# Patient Record
Sex: FEMALE | Race: WHITE | NOT HISPANIC OR LATINO | Employment: UNEMPLOYED | URBAN - NONMETROPOLITAN AREA
[De-identification: names, ages, dates, MRNs, and addresses within clinical notes are randomized per-mention and may not be internally consistent; named-entity substitution may affect disease eponyms.]

---

## 2017-09-15 ENCOUNTER — APPOINTMENT (OUTPATIENT)
Dept: CT IMAGING | Facility: HOSPITAL | Age: 67
End: 2017-09-15

## 2017-09-15 ENCOUNTER — APPOINTMENT (OUTPATIENT)
Dept: GENERAL RADIOLOGY | Facility: HOSPITAL | Age: 67
End: 2017-09-15

## 2017-09-15 ENCOUNTER — HOSPITAL ENCOUNTER (INPATIENT)
Facility: HOSPITAL | Age: 67
LOS: 4 days | Discharge: HOME | End: 2017-09-19
Attending: SPECIALIST | Admitting: SPECIALIST

## 2017-09-15 DIAGNOSIS — Z74.09 IMPAIRED FUNCTIONAL MOBILITY, BALANCE, AND ENDURANCE: ICD-10-CM

## 2017-09-15 DIAGNOSIS — S22.49XA MULTIPLE FRACTURES OF RIB INVOLVING FOUR OR MORE RIBS: Primary | ICD-10-CM

## 2017-09-15 LAB
ALBUMIN SERPL-MCNC: 3.7 G/DL (ref 3.5–5)
ALBUMIN/GLOB SERPL: 1.2 G/DL (ref 1.1–2.5)
ALP SERPL-CCNC: 102 U/L (ref 24–120)
ALT SERPL W P-5'-P-CCNC: 35 U/L (ref 0–54)
ANION GAP SERPL CALCULATED.3IONS-SCNC: 7 MMOL/L (ref 4–13)
AST SERPL-CCNC: 41 U/L (ref 7–45)
BACTERIA UR QL AUTO: ABNORMAL /HPF
BILIRUB SERPL-MCNC: 1.2 MG/DL (ref 0.1–1)
BILIRUB UR QL STRIP: NEGATIVE
BUN BLD-MCNC: 24 MG/DL (ref 5–21)
BUN/CREAT SERPL: 32.4 (ref 7–25)
CALCIUM SPEC-SCNC: 9.7 MG/DL (ref 8.4–10.4)
CHLORIDE SERPL-SCNC: 106 MMOL/L (ref 98–110)
CK MB SERPL-CCNC: 3.11 NG/ML (ref 0–5)
CK SERPL-CCNC: 132 U/L (ref 0–203)
CK SERPL-CCNC: 132 U/L (ref 0–203)
CLARITY UR: ABNORMAL
CO2 SERPL-SCNC: 27 MMOL/L (ref 24–31)
COLOR UR: YELLOW
CREAT BLD-MCNC: 0.74 MG/DL (ref 0.5–1.4)
GFR SERPL CREATININE-BSD FRML MDRD: 78 ML/MIN/1.73
GLOBULIN UR ELPH-MCNC: 3.1 GM/DL
GLUCOSE BLD-MCNC: 258 MG/DL (ref 70–100)
GLUCOSE BLDC GLUCOMTR-MCNC: 254 MG/DL (ref 70–130)
GLUCOSE UR STRIP-MCNC: ABNORMAL MG/DL
HBA1C MFR BLD: 9.1 %
HGB UR QL STRIP.AUTO: NEGATIVE
HYALINE CASTS UR QL AUTO: ABNORMAL /LPF
KETONES UR QL STRIP: ABNORMAL
LEUKOCYTE ESTERASE UR QL STRIP.AUTO: ABNORMAL
NITRITE UR QL STRIP: POSITIVE
PH UR STRIP.AUTO: <=5 [PH] (ref 5–8)
POTASSIUM BLD-SCNC: 5.1 MMOL/L (ref 3.5–5.3)
PROT SERPL-MCNC: 6.8 G/DL (ref 6.3–8.7)
PROT UR QL STRIP: NEGATIVE
RBC # UR: ABNORMAL /HPF
REF LAB TEST METHOD: ABNORMAL
SODIUM BLD-SCNC: 140 MMOL/L (ref 135–145)
SP GR UR STRIP: >1.03 (ref 1–1.03)
SQUAMOUS #/AREA URNS HPF: ABNORMAL /HPF
TROPONIN I SERPL-MCNC: <0.012 NG/ML (ref 0–0.03)
UROBILINOGEN UR QL STRIP: ABNORMAL
WBC UR QL AUTO: ABNORMAL /HPF

## 2017-09-15 PROCEDURE — 25010000002 HEPARIN (PORCINE) PER 1000 UNITS: Performed by: NEUROLOGICAL SURGERY

## 2017-09-15 PROCEDURE — 84484 ASSAY OF TROPONIN QUANT: CPT | Performed by: SPECIALIST

## 2017-09-15 PROCEDURE — 83036 HEMOGLOBIN GLYCOSYLATED A1C: CPT | Performed by: SPECIALIST

## 2017-09-15 PROCEDURE — 82962 GLUCOSE BLOOD TEST: CPT

## 2017-09-15 PROCEDURE — 71010 HC CHEST PA OR AP: CPT

## 2017-09-15 PROCEDURE — 87186 SC STD MICRODIL/AGAR DIL: CPT | Performed by: SPECIALIST

## 2017-09-15 PROCEDURE — 94799 UNLISTED PULMONARY SVC/PX: CPT

## 2017-09-15 PROCEDURE — 80053 COMPREHEN METABOLIC PANEL: CPT | Performed by: NEUROLOGICAL SURGERY

## 2017-09-15 PROCEDURE — 87077 CULTURE AEROBIC IDENTIFY: CPT | Performed by: SPECIALIST

## 2017-09-15 PROCEDURE — 63710000001 INSULIN LISPRO (HUMAN) PER 5 UNITS: Performed by: SPECIALIST

## 2017-09-15 PROCEDURE — 70450 CT HEAD/BRAIN W/O DYE: CPT

## 2017-09-15 PROCEDURE — 70496 CT ANGIOGRAPHY HEAD: CPT

## 2017-09-15 PROCEDURE — 25010000002 HYDROMORPHONE HCL-NACL 50-0.9 MG/50ML-% SOLUTION PREFILLED SYRINGE: Performed by: SPECIALIST

## 2017-09-15 PROCEDURE — 70498 CT ANGIOGRAPHY NECK: CPT

## 2017-09-15 PROCEDURE — 93010 ELECTROCARDIOGRAM REPORT: CPT | Performed by: INTERNAL MEDICINE

## 2017-09-15 PROCEDURE — 87086 URINE CULTURE/COLONY COUNT: CPT | Performed by: SPECIALIST

## 2017-09-15 PROCEDURE — 82550 ASSAY OF CK (CPK): CPT | Performed by: SPECIALIST

## 2017-09-15 PROCEDURE — 81001 URINALYSIS AUTO W/SCOPE: CPT | Performed by: SPECIALIST

## 2017-09-15 PROCEDURE — 0 IOPAMIDOL PER 1 ML: Performed by: SPECIALIST

## 2017-09-15 PROCEDURE — 99223 1ST HOSP IP/OBS HIGH 75: CPT | Performed by: NEUROLOGICAL SURGERY

## 2017-09-15 PROCEDURE — 93005 ELECTROCARDIOGRAM TRACING: CPT | Performed by: SPECIALIST

## 2017-09-15 PROCEDURE — 82553 CREATINE MB FRACTION: CPT | Performed by: SPECIALIST

## 2017-09-15 RX ORDER — ALENDRONATE SODIUM 70 MG/1
70 TABLET ORAL
COMMUNITY

## 2017-09-15 RX ORDER — SUCRALFATE ORAL 1 G/10ML
1 SUSPENSION ORAL EVERY 6 HOURS SCHEDULED
Status: DISCONTINUED | OUTPATIENT
Start: 2017-09-15 | End: 2017-09-19 | Stop reason: HOSPADM

## 2017-09-15 RX ORDER — HEPARIN SODIUM 5000 [USP'U]/ML
5000 INJECTION, SOLUTION INTRAVENOUS; SUBCUTANEOUS EVERY 12 HOURS SCHEDULED
Status: DISCONTINUED | OUTPATIENT
Start: 2017-09-15 | End: 2017-09-19 | Stop reason: HOSPADM

## 2017-09-15 RX ORDER — NALOXONE HCL 0.4 MG/ML
0.1 VIAL (ML) INJECTION
Status: DISCONTINUED | OUTPATIENT
Start: 2017-09-15 | End: 2017-09-19 | Stop reason: HOSPADM

## 2017-09-15 RX ORDER — GABAPENTIN 300 MG/1
300 CAPSULE ORAL 2 TIMES DAILY
COMMUNITY

## 2017-09-15 RX ORDER — HYDROMORPHONE HCL IN 0.9% NACL 50 MG/50ML
PATIENT CONTROLLED ANALGESIA SYRINGE INTRAVENOUS CONTINUOUS
Status: DISCONTINUED | OUTPATIENT
Start: 2017-09-15 | End: 2017-09-18

## 2017-09-15 RX ORDER — DEXTROSE MONOHYDRATE 25 G/50ML
25 INJECTION, SOLUTION INTRAVENOUS
Status: DISCONTINUED | OUTPATIENT
Start: 2017-09-15 | End: 2017-09-16

## 2017-09-15 RX ORDER — DEXTROSE, SODIUM CHLORIDE, AND POTASSIUM CHLORIDE 5; .45; .15 G/100ML; G/100ML; G/100ML
125 INJECTION INTRAVENOUS CONTINUOUS
Status: DISCONTINUED | OUTPATIENT
Start: 2017-09-15 | End: 2017-09-15

## 2017-09-15 RX ORDER — GLIPIZIDE 10 MG/1
10 TABLET ORAL
COMMUNITY

## 2017-09-15 RX ORDER — HYDROCODONE BITARTRATE AND ACETAMINOPHEN 7.5; 325 MG/1; MG/1
1 TABLET ORAL EVERY 4 HOURS PRN
Status: DISCONTINUED | OUTPATIENT
Start: 2017-09-15 | End: 2017-09-19 | Stop reason: HOSPADM

## 2017-09-15 RX ORDER — DIPHENHYDRAMINE HYDROCHLORIDE 50 MG/ML
25 INJECTION INTRAMUSCULAR; INTRAVENOUS EVERY 6 HOURS PRN
Status: DISCONTINUED | OUTPATIENT
Start: 2017-09-15 | End: 2017-09-19 | Stop reason: HOSPADM

## 2017-09-15 RX ORDER — DEXTROSE AND SODIUM CHLORIDE 5; .45 G/100ML; G/100ML
125 INJECTION, SOLUTION INTRAVENOUS CONTINUOUS
Status: DISCONTINUED | OUTPATIENT
Start: 2017-09-15 | End: 2017-09-16

## 2017-09-15 RX ORDER — NICOTINE POLACRILEX 4 MG
15 LOZENGE BUCCAL
Status: DISCONTINUED | OUTPATIENT
Start: 2017-09-15 | End: 2017-09-19 | Stop reason: HOSPADM

## 2017-09-15 RX ORDER — ONDANSETRON 8 MG/1
8 TABLET, ORALLY DISINTEGRATING ORAL EVERY 6 HOURS PRN
Status: DISCONTINUED | OUTPATIENT
Start: 2017-09-15 | End: 2017-09-19 | Stop reason: HOSPADM

## 2017-09-15 RX ORDER — SODIUM CHLORIDE 0.9 % (FLUSH) 0.9 %
1-10 SYRINGE (ML) INJECTION AS NEEDED
Status: DISCONTINUED | OUTPATIENT
Start: 2017-09-15 | End: 2017-09-19 | Stop reason: HOSPADM

## 2017-09-15 RX ORDER — PANTOPRAZOLE SODIUM 40 MG/1
40 TABLET, DELAYED RELEASE ORAL
Status: DISCONTINUED | OUTPATIENT
Start: 2017-09-15 | End: 2017-09-19 | Stop reason: HOSPADM

## 2017-09-15 RX ORDER — OXYCODONE HYDROCHLORIDE AND ACETAMINOPHEN 5; 325 MG/1; MG/1
1 TABLET ORAL EVERY 4 HOURS PRN
Status: DISCONTINUED | OUTPATIENT
Start: 2017-09-15 | End: 2017-09-17 | Stop reason: SDUPTHER

## 2017-09-15 RX ORDER — PAROXETINE 10 MG/1
10 TABLET, FILM COATED ORAL NIGHTLY PRN
COMMUNITY

## 2017-09-15 RX ORDER — ONDANSETRON HCL IN 0.9 % NACL 8 MG/50 ML
8 INTRAVENOUS SOLUTION, PIGGYBACK (ML) INTRAVENOUS EVERY 6 HOURS PRN
Status: DISCONTINUED | OUTPATIENT
Start: 2017-09-15 | End: 2017-09-19 | Stop reason: HOSPADM

## 2017-09-15 RX ADMIN — Medication: at 14:15

## 2017-09-15 RX ADMIN — PANTOPRAZOLE SODIUM 40 MG: 40 TABLET, DELAYED RELEASE ORAL at 18:05

## 2017-09-15 RX ADMIN — POTASSIUM CHLORIDE, DEXTROSE MONOHYDRATE AND SODIUM CHLORIDE 125 ML/HR: 150; 5; 450 INJECTION, SOLUTION INTRAVENOUS at 14:11

## 2017-09-15 RX ADMIN — SUCRALFATE 1 G: 1 SUSPENSION ORAL at 14:51

## 2017-09-15 RX ADMIN — HEPARIN SODIUM 5000 UNITS: 5000 INJECTION, SOLUTION INTRAVENOUS; SUBCUTANEOUS at 20:53

## 2017-09-15 RX ADMIN — DEXTROSE AND SODIUM CHLORIDE 125 ML/HR: 5; 450 INJECTION, SOLUTION INTRAVENOUS at 15:28

## 2017-09-15 RX ADMIN — IOPAMIDOL 100 ML: 755 INJECTION, SOLUTION INTRAVENOUS at 18:45

## 2017-09-15 RX ADMIN — INSULIN LISPRO 4 UNITS: 100 INJECTION, SOLUTION INTRAVENOUS; SUBCUTANEOUS at 20:53

## 2017-09-15 RX ADMIN — SUCRALFATE 1 G: 1 SUSPENSION ORAL at 18:05

## 2017-09-15 RX ADMIN — SODIUM CHLORIDE, POTASSIUM CHLORIDE, SODIUM LACTATE AND CALCIUM CHLORIDE 500 ML: 600; 310; 30; 20 INJECTION, SOLUTION INTRAVENOUS at 15:26
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
## 2017-09-15 NOTE — PROGRESS NOTES
Following discussion with neurosurgery and orthopedics will plan a CTA of the head and neck this afternoon to evaluate her cranium no surgery is planned on her right wrist, we can start Lovenox when okay with orthopedics and neurosurgery neurosurgery desires heparin and I am fine with heparin and he can dose as needed for neurosurgical concerns.

## 2017-09-15 NOTE — PLAN OF CARE
Problem: Patient Care Overview (Adult)  Goal: Plan of Care Review  Outcome: Ongoing (interventions implemented as appropriate)    09/15/17 0923   Coping/Psychosocial Response Interventions   Plan Of Care Reviewed With patient   Patient Care Overview   Progress no change       Goal: Adult Individualization and Mutuality  Outcome: Ongoing (interventions implemented as appropriate)  Goal: Discharge Needs Assessment  Outcome: Ongoing (interventions implemented as appropriate)    Problem: Orthopaedic Fracture (Adult)  Goal: Signs and Symptoms of Listed Potential Problems Will be Absent or Manageable (Orthopaedic Fracture)  Outcome: Ongoing (interventions implemented as appropriate)    Problem: Pain, Acute (Adult)  Goal: Identify Related Risk Factors and Signs and Symptoms  Outcome: Ongoing (interventions implemented as appropriate)  Goal: Acceptable Pain Control/Comfort Level  Outcome: Ongoing (interventions implemented as appropriate)
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
## 2017-09-15 NOTE — PROGRESS NOTES
Patient is doing well this afternoon the CT scan has been accomplished the neck and cervical neck and upper chest are within normal limits that head CT shows a suspected trace subarachnoid hemorrhage along the anterior falx and frontoparietal scalp subgaleal hematoma no other problems noted she is eating I have advised her to increase her activity we will begin ambulation in the a.m.
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
## 2017-09-16 ENCOUNTER — APPOINTMENT (OUTPATIENT)
Dept: GENERAL RADIOLOGY | Facility: HOSPITAL | Age: 67
End: 2017-09-16

## 2017-09-16 LAB
ALBUMIN SERPL-MCNC: 3.1 G/DL (ref 3.5–5)
ALBUMIN/GLOB SERPL: 1.1 G/DL (ref 1.1–2.5)
ALP SERPL-CCNC: 75 U/L (ref 24–120)
ALT SERPL W P-5'-P-CCNC: 30 U/L (ref 0–54)
ANION GAP SERPL CALCULATED.3IONS-SCNC: 7 MMOL/L (ref 4–13)
AST SERPL-CCNC: 26 U/L (ref 7–45)
BILIRUB SERPL-MCNC: 1.2 MG/DL (ref 0.1–1)
BUN BLD-MCNC: 18 MG/DL (ref 5–21)
BUN/CREAT SERPL: 28.1 (ref 7–25)
CALCIUM SPEC-SCNC: 8.5 MG/DL (ref 8.4–10.4)
CHLORIDE SERPL-SCNC: 104 MMOL/L (ref 98–110)
CO2 SERPL-SCNC: 26 MMOL/L (ref 24–31)
CREAT BLD-MCNC: 0.64 MG/DL (ref 0.5–1.4)
DEPRECATED RDW RBC AUTO: 46.5 FL (ref 40–54)
ERYTHROCYTE [DISTWIDTH] IN BLOOD BY AUTOMATED COUNT: 14.3 % (ref 12–15)
GFR SERPL CREATININE-BSD FRML MDRD: 93 ML/MIN/1.73
GLOBULIN UR ELPH-MCNC: 2.9 GM/DL
GLUCOSE BLD-MCNC: 273 MG/DL (ref 70–100)
GLUCOSE BLDC GLUCOMTR-MCNC: 233 MG/DL (ref 70–130)
GLUCOSE BLDC GLUCOMTR-MCNC: 270 MG/DL (ref 70–130)
HCT VFR BLD AUTO: 35.7 % (ref 37–47)
HGB BLD-MCNC: 11.3 G/DL (ref 12–16)
MCH RBC QN AUTO: 28.3 PG (ref 28–32)
MCHC RBC AUTO-ENTMCNC: 31.7 G/DL (ref 33–36)
MCV RBC AUTO: 89.3 FL (ref 82–98)
PLATELET # BLD AUTO: 204 10*3/MM3 (ref 130–400)
PMV BLD AUTO: 11.4 FL (ref 6–12)
POTASSIUM BLD-SCNC: 4.7 MMOL/L (ref 3.5–5.3)
PROT SERPL-MCNC: 6 G/DL (ref 6.3–8.7)
RBC # BLD AUTO: 4 10*6/MM3 (ref 4.2–5.4)
SODIUM BLD-SCNC: 137 MMOL/L (ref 135–145)
WBC NRBC COR # BLD: 7.91 10*3/MM3 (ref 4.8–10.8)

## 2017-09-16 PROCEDURE — 85027 COMPLETE CBC AUTOMATED: CPT | Performed by: SPECIALIST

## 2017-09-16 PROCEDURE — 71020 HC CHEST PA AND LATERAL: CPT

## 2017-09-16 PROCEDURE — 94799 UNLISTED PULMONARY SVC/PX: CPT

## 2017-09-16 PROCEDURE — 25010000002 HEPARIN (PORCINE) PER 1000 UNITS: Performed by: NEUROLOGICAL SURGERY

## 2017-09-16 PROCEDURE — 63710000001 INSULIN LISPRO (HUMAN) PER 5 UNITS: Performed by: SPECIALIST

## 2017-09-16 PROCEDURE — 99231 SBSQ HOSP IP/OBS SF/LOW 25: CPT | Performed by: NEUROLOGICAL SURGERY

## 2017-09-16 PROCEDURE — 74020 HC XR ABDOMEN FLAT & UPRIGHT: CPT

## 2017-09-16 PROCEDURE — 80053 COMPREHEN METABOLIC PANEL: CPT | Performed by: SPECIALIST

## 2017-09-16 PROCEDURE — 82962 GLUCOSE BLOOD TEST: CPT

## 2017-09-16 RX ORDER — DEXTROSE AND SODIUM CHLORIDE 5; .45 G/100ML; G/100ML
50 INJECTION, SOLUTION INTRAVENOUS CONTINUOUS
Status: DISCONTINUED | OUTPATIENT
Start: 2017-09-16 | End: 2017-09-19 | Stop reason: HOSPADM

## 2017-09-16 RX ORDER — PAROXETINE 10 MG/1
10 TABLET, FILM COATED ORAL DAILY
Status: DISCONTINUED | OUTPATIENT
Start: 2017-09-16 | End: 2017-09-19 | Stop reason: HOSPADM

## 2017-09-16 RX ORDER — GABAPENTIN 300 MG/1
300 CAPSULE ORAL EVERY 12 HOURS SCHEDULED
Status: DISCONTINUED | OUTPATIENT
Start: 2017-09-16 | End: 2017-09-19 | Stop reason: HOSPADM

## 2017-09-16 RX ORDER — GLIPIZIDE 10 MG/1
10 TABLET ORAL
Status: DISCONTINUED | OUTPATIENT
Start: 2017-09-16 | End: 2017-09-19 | Stop reason: HOSPADM

## 2017-09-16 RX ADMIN — INSULIN LISPRO 3 UNITS: 100 INJECTION, SOLUTION INTRAVENOUS; SUBCUTANEOUS at 21:37

## 2017-09-16 RX ADMIN — SUCRALFATE 1 G: 1 SUSPENSION ORAL at 00:02

## 2017-09-16 RX ADMIN — DEXTROSE AND SODIUM CHLORIDE 50 ML/HR: 5; 450 INJECTION, SOLUTION INTRAVENOUS at 12:21

## 2017-09-16 RX ADMIN — METFORMIN HYDROCHLORIDE 1000 MG: 500 TABLET ORAL at 17:03

## 2017-09-16 RX ADMIN — DEXTROSE AND SODIUM CHLORIDE 125 ML/HR: 5; 450 INJECTION, SOLUTION INTRAVENOUS at 08:09

## 2017-09-16 RX ADMIN — GLIPIZIDE 10 MG: 10 TABLET ORAL at 17:03

## 2017-09-16 RX ADMIN — GABAPENTIN 300 MG: 300 CAPSULE ORAL at 21:37

## 2017-09-16 RX ADMIN — INSULIN LISPRO 4 UNITS: 100 INJECTION, SOLUTION INTRAVENOUS; SUBCUTANEOUS at 09:07

## 2017-09-16 RX ADMIN — HEPARIN SODIUM 5000 UNITS: 5000 INJECTION, SOLUTION INTRAVENOUS; SUBCUTANEOUS at 22:34

## 2017-09-16 RX ADMIN — PANTOPRAZOLE SODIUM 40 MG: 40 TABLET, DELAYED RELEASE ORAL at 17:03

## 2017-09-16 RX ADMIN — DEXTROSE AND SODIUM CHLORIDE 125 ML/HR: 5; 450 INJECTION, SOLUTION INTRAVENOUS at 00:04

## 2017-09-16 RX ADMIN — PANTOPRAZOLE SODIUM 40 MG: 40 TABLET, DELAYED RELEASE ORAL at 06:35

## 2017-09-16 RX ADMIN — HEPARIN SODIUM 5000 UNITS: 5000 INJECTION, SOLUTION INTRAVENOUS; SUBCUTANEOUS at 09:07

## 2017-09-16 RX ADMIN — GABAPENTIN 300 MG: 300 CAPSULE ORAL at 12:21

## 2017-09-16 RX ADMIN — METFORMIN HYDROCHLORIDE 1000 MG: 500 TABLET ORAL at 13:30

## 2017-09-16 RX ADMIN — SUCRALFATE 1 G: 1 SUSPENSION ORAL at 06:26
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
## 2017-09-16 NOTE — PLAN OF CARE
Problem: Patient Care Overview (Adult)  Goal: Plan of Care Review  Outcome: Ongoing (interventions implemented as appropriate)    09/16/17 0433   Coping/Psychosocial Response Interventions   Plan Of Care Reviewed With patient   Patient Care Overview   Progress improving   Outcome Evaluation   Outcome Summary/Follow up Plan The patient has been able to control her pain adequately with her PCA. She has slept through the night and had no issues using the bed pan. She expresses the desire to move more today and states she feels much better.        Goal: Adult Individualization and Mutuality  Outcome: Ongoing (interventions implemented as appropriate)  Goal: Discharge Needs Assessment  Outcome: Ongoing (interventions implemented as appropriate)    Problem: Orthopaedic Fracture (Adult)  Goal: Signs and Symptoms of Listed Potential Problems Will be Absent or Manageable (Orthopaedic Fracture)  Outcome: Ongoing (interventions implemented as appropriate)    Problem: Pain, Acute (Adult)  Goal: Identify Related Risk Factors and Signs and Symptoms  Outcome: Ongoing (interventions implemented as appropriate)  Goal: Acceptable Pain Control/Comfort Level  Outcome: Ongoing (interventions implemented as appropriate)    Problem: Fall Risk (Adult)  Goal: Identify Related Risk Factors and Signs and Symptoms  Outcome: Ongoing (interventions implemented as appropriate)  Goal: Absence of Falls  Outcome: Ongoing (interventions implemented as appropriate)

## 2017-09-17 LAB
BACTERIA SPEC AEROBE CULT: ABNORMAL
GLUCOSE BLDC GLUCOMTR-MCNC: 174 MG/DL (ref 70–130)
GLUCOSE BLDC GLUCOMTR-MCNC: 234 MG/DL (ref 70–130)
GLUCOSE BLDC GLUCOMTR-MCNC: 268 MG/DL (ref 70–130)
GLUCOSE BLDC GLUCOMTR-MCNC: 270 MG/DL (ref 70–130)

## 2017-09-17 PROCEDURE — 63710000001 INSULIN LISPRO (HUMAN) PER 5 UNITS: Performed by: SPECIALIST

## 2017-09-17 PROCEDURE — 25010000002 HEPARIN (PORCINE) PER 1000 UNITS: Performed by: NEUROLOGICAL SURGERY

## 2017-09-17 PROCEDURE — 82962 GLUCOSE BLOOD TEST: CPT

## 2017-09-17 PROCEDURE — 94799 UNLISTED PULMONARY SVC/PX: CPT

## 2017-09-17 PROCEDURE — 25010000002 ONDANSETRON PER 1 MG: Performed by: SPECIALIST

## 2017-09-17 RX ORDER — OXYCODONE HYDROCHLORIDE AND ACETAMINOPHEN 5; 325 MG/1; MG/1
1 TABLET ORAL EVERY 4 HOURS PRN
Status: DISCONTINUED | OUTPATIENT
Start: 2017-09-17 | End: 2017-09-19 | Stop reason: HOSPADM

## 2017-09-17 RX ADMIN — HYDROCODONE BITARTRATE AND ACETAMINOPHEN 1 TABLET: 7.5; 325 TABLET ORAL at 14:56

## 2017-09-17 RX ADMIN — METFORMIN HYDROCHLORIDE 1000 MG: 500 TABLET ORAL at 08:17

## 2017-09-17 RX ADMIN — GABAPENTIN 300 MG: 300 CAPSULE ORAL at 08:17

## 2017-09-17 RX ADMIN — PANTOPRAZOLE SODIUM 40 MG: 40 TABLET, DELAYED RELEASE ORAL at 17:00

## 2017-09-17 RX ADMIN — GLIPIZIDE 10 MG: 10 TABLET ORAL at 08:17

## 2017-09-17 RX ADMIN — SUCRALFATE 1 G: 1 SUSPENSION ORAL at 06:21

## 2017-09-17 RX ADMIN — GLIPIZIDE 10 MG: 10 TABLET ORAL at 17:01

## 2017-09-17 RX ADMIN — PAROXETINE 10 MG: 10 TABLET, FILM COATED ORAL at 08:17

## 2017-09-17 RX ADMIN — DEXTROSE AND SODIUM CHLORIDE 50 ML/HR: 5; 450 INJECTION, SOLUTION INTRAVENOUS at 18:34

## 2017-09-17 RX ADMIN — INSULIN LISPRO 3 UNITS: 100 INJECTION, SOLUTION INTRAVENOUS; SUBCUTANEOUS at 17:15

## 2017-09-17 RX ADMIN — SUCRALFATE 1 G: 1 SUSPENSION ORAL at 12:36

## 2017-09-17 RX ADMIN — PANTOPRAZOLE SODIUM 40 MG: 40 TABLET, DELAYED RELEASE ORAL at 08:17

## 2017-09-17 RX ADMIN — SUCRALFATE 1 G: 1 SUSPENSION ORAL at 17:00

## 2017-09-17 RX ADMIN — GABAPENTIN 300 MG: 300 CAPSULE ORAL at 22:44

## 2017-09-17 RX ADMIN — ONDANSETRON 8 MG: 2 INJECTION INTRAMUSCULAR; INTRAVENOUS at 08:17

## 2017-09-17 RX ADMIN — METFORMIN HYDROCHLORIDE 1000 MG: 500 TABLET ORAL at 17:01

## 2017-09-17 RX ADMIN — DEXTROSE AND SODIUM CHLORIDE 50 ML/HR: 5; 450 INJECTION, SOLUTION INTRAVENOUS at 00:35

## 2017-09-17 RX ADMIN — HEPARIN SODIUM 5000 UNITS: 5000 INJECTION, SOLUTION INTRAVENOUS; SUBCUTANEOUS at 08:17

## 2017-09-17 RX ADMIN — SUCRALFATE 1 G: 1 SUSPENSION ORAL at 00:35

## 2017-09-17 RX ADMIN — INSULIN LISPRO 2 UNITS: 100 INJECTION, SOLUTION INTRAVENOUS; SUBCUTANEOUS at 22:44

## 2017-09-17 RX ADMIN — SUCRALFATE 1 G: 1 SUSPENSION ORAL at 23:04

## 2017-09-17 RX ADMIN — HEPARIN SODIUM 5000 UNITS: 5000 INJECTION, SOLUTION INTRAVENOUS; SUBCUTANEOUS at 22:44
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
## 2017-09-17 NOTE — PLAN OF CARE
Problem: Patient Care Overview (Adult)  Goal: Plan of Care Review  Outcome: Ongoing (interventions implemented as appropriate)    09/17/17 0223   Coping/Psychosocial Response Interventions   Plan Of Care Reviewed With patient   Patient Care Overview   Progress improving       Goal: Adult Individualization and Mutuality  Outcome: Ongoing (interventions implemented as appropriate)  Goal: Discharge Needs Assessment  Outcome: Ongoing (interventions implemented as appropriate)    Problem: Orthopaedic Fracture (Adult)  Goal: Signs and Symptoms of Listed Potential Problems Will be Absent or Manageable (Orthopaedic Fracture)  Outcome: Ongoing (interventions implemented as appropriate)    Problem: Pain, Acute (Adult)  Goal: Identify Related Risk Factors and Signs and Symptoms  Outcome: Ongoing (interventions implemented as appropriate)  Goal: Acceptable Pain Control/Comfort Level  Outcome: Ongoing (interventions implemented as appropriate)    Problem: Fall Risk (Adult)  Goal: Identify Related Risk Factors and Signs and Symptoms  Outcome: Ongoing (interventions implemented as appropriate)  Goal: Absence of Falls  Outcome: Ongoing (interventions implemented as appropriate)

## 2017-09-17 NOTE — PLAN OF CARE
Problem: Patient Care Overview (Adult)  Goal: Plan of Care Review  Outcome: Ongoing (interventions implemented as appropriate)    09/17/17 1610   Coping/Psychosocial Response Interventions   Plan Of Care Reviewed With patient   Patient Care Overview   Progress no change   Outcome Evaluation   Outcome Summary/Follow up Plan Patient c/o pain during coughing, but rates only minimal pain when resting. Pca cont. Also provided norco x1. Reports decreased pain w/ medication. Patient is ambulating well, but does appear to be incredibly nauseated occassionally during ambulation. Patient did have 1 emesis this AM. Tolerating diet, but reports a decreased appetite. Patient is hopeful for d/c tomorrow, as she believes she can rest/heal better from home. Will cont to monitor.        Goal: Adult Individualization and Mutuality  Outcome: Ongoing (interventions implemented as appropriate)  Goal: Discharge Needs Assessment  Outcome: Ongoing (interventions implemented as appropriate)    Problem: Orthopaedic Fracture (Adult)  Goal: Signs and Symptoms of Listed Potential Problems Will be Absent or Manageable (Orthopaedic Fracture)  Outcome: Ongoing (interventions implemented as appropriate)    09/17/17 1610   Orthopaedic Fracture   Problems Assessed (Orthopaedic Fracture) all   Problems Present (Orthopaedic Fracture) pain;situational response         Problem: Pain, Acute (Adult)  Goal: Identify Related Risk Factors and Signs and Symptoms  Outcome: Ongoing (interventions implemented as appropriate)    09/17/17 1610   Pain, Acute   Related Risk Factors (Acute Pain) persistent pain   Signs and Symptoms (Acute Pain) verbalization of pain descriptors       Goal: Acceptable Pain Control/Comfort Level  Outcome: Ongoing (interventions implemented as appropriate)    Problem: Fall Risk (Adult)  Goal: Identify Related Risk Factors and Signs and Symptoms  Outcome: Ongoing (interventions implemented as appropriate)    09/17/17 1610   Fall Risk    Fall Risk: Related Risk Factors environment unfamiliar   Fall Risk: Signs and Symptoms presence of risk factors       Goal: Absence of Falls  Outcome: Ongoing (interventions implemented as appropriate)

## 2017-09-18 LAB
GLUCOSE BLDC GLUCOMTR-MCNC: 204 MG/DL (ref 70–130)
GLUCOSE BLDC GLUCOMTR-MCNC: 213 MG/DL (ref 70–130)

## 2017-09-18 PROCEDURE — 25010000002 HEPARIN (PORCINE) PER 1000 UNITS: Performed by: NEUROLOGICAL SURGERY

## 2017-09-18 PROCEDURE — 82962 GLUCOSE BLOOD TEST: CPT

## 2017-09-18 PROCEDURE — 63710000001 INSULIN LISPRO (HUMAN) PER 5 UNITS: Performed by: SPECIALIST

## 2017-09-18 RX ADMIN — GLIPIZIDE 10 MG: 10 TABLET ORAL at 08:08

## 2017-09-18 RX ADMIN — HYDROCODONE BITARTRATE AND ACETAMINOPHEN 1 TABLET: 7.5; 325 TABLET ORAL at 19:56

## 2017-09-18 RX ADMIN — PANTOPRAZOLE SODIUM 40 MG: 40 TABLET, DELAYED RELEASE ORAL at 06:33

## 2017-09-18 RX ADMIN — SUCRALFATE 1 G: 1 SUSPENSION ORAL at 17:47

## 2017-09-18 RX ADMIN — GABAPENTIN 300 MG: 300 CAPSULE ORAL at 20:13

## 2017-09-18 RX ADMIN — GABAPENTIN 300 MG: 300 CAPSULE ORAL at 08:08

## 2017-09-18 RX ADMIN — METFORMIN HYDROCHLORIDE 1000 MG: 500 TABLET ORAL at 08:08

## 2017-09-18 RX ADMIN — SUCRALFATE 1 G: 1 SUSPENSION ORAL at 06:33

## 2017-09-18 RX ADMIN — PAROXETINE 10 MG: 10 TABLET, FILM COATED ORAL at 08:08

## 2017-09-18 RX ADMIN — INSULIN LISPRO 3 UNITS: 100 INJECTION, SOLUTION INTRAVENOUS; SUBCUTANEOUS at 20:22

## 2017-09-18 RX ADMIN — HEPARIN SODIUM 5000 UNITS: 5000 INJECTION, SOLUTION INTRAVENOUS; SUBCUTANEOUS at 08:08

## 2017-09-18 RX ADMIN — HEPARIN SODIUM 5000 UNITS: 5000 INJECTION, SOLUTION INTRAVENOUS; SUBCUTANEOUS at 20:13

## 2017-09-18 RX ADMIN — GLIPIZIDE 10 MG: 10 TABLET ORAL at 17:47

## 2017-09-18 RX ADMIN — METFORMIN HYDROCHLORIDE 1000 MG: 500 TABLET ORAL at 17:47

## 2017-09-18 RX ADMIN — INSULIN LISPRO 3 UNITS: 100 INJECTION, SOLUTION INTRAVENOUS; SUBCUTANEOUS at 08:12

## 2017-09-18 NOTE — PLAN OF CARE
Problem: Orthopaedic Fracture (Adult)  Goal: Signs and Symptoms of Listed Potential Problems Will be Absent or Manageable (Orthopaedic Fracture)  Outcome: Ongoing (interventions implemented as appropriate)

## 2017-09-18 NOTE — PLAN OF CARE
Problem: Patient Care Overview (Adult)  Goal: Plan of Care Review  Outcome: Ongoing (interventions implemented as appropriate)  Pain decreased today. Encouraged to use po pain meds  Instead of IV PCA plan is to discharge in am.
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
## 2017-09-18 NOTE — PLAN OF CARE
Problem: Patient Care Overview (Adult)  Goal: Plan of Care Review  Outcome: Ongoing (interventions implemented as appropriate)    09/18/17 0300   Coping/Psychosocial Response Interventions   Plan Of Care Reviewed With patient   Patient Care Overview   Progress no change   Outcome Evaluation   Outcome Summary/Follow up Plan Pt c/o pain only when asked. Dilaudid PCA remains in use. Up to BSC. Pt becomes extremely weak and nauseated with the small walk to BSC. Otherwise, no c/o nausea. Pt also educated on importance of insulin and has stopped refusing it. VSS. Will continue to monitor.        Goal: Adult Individualization and Mutuality  Outcome: Ongoing (interventions implemented as appropriate)    09/18/17 0300   Individualization   Patient Specific Preferences Prefers lights left on.   Patient Specific Goals Goal is to return home this week and to gain as much indepence back as possible.    Patient Specific Interventions Dilaudid PCA   Mutuality/Individual Preferences   What Anxieties, Fears or Concerns Do You Have About Your Health or Care? none identified   What Questions Do You Have About Your Health or Care? When do you think I will be discharged?   What Information Would Help Us Give You More Personalized Care? none identified         Problem: Orthopaedic Fracture (Adult)  Goal: Signs and Symptoms of Listed Potential Problems Will be Absent or Manageable (Orthopaedic Fracture)  Outcome: Ongoing (interventions implemented as appropriate)    09/18/17 0300   Orthopaedic Fracture   Problems Assessed (Orthopaedic Fracture) all   Problems Present (Orthopaedic Fracture) pain;situational response         Problem: Pain, Acute (Adult)  Goal: Identify Related Risk Factors and Signs and Symptoms  Outcome: Ongoing (interventions implemented as appropriate)    09/18/17 0300   Pain, Acute   Related Risk Factors (Acute Pain) persistent pain;positioning   Signs and Symptoms (Acute Pain) verbalization of pain descriptors        Goal: Acceptable Pain Control/Comfort Level  Outcome: Ongoing (interventions implemented as appropriate)    09/18/17 0300   Pain, Acute (Adult)   Acceptable Pain Control/Comfort Level making progress toward outcome         Problem: Fall Risk (Adult)  Goal: Identify Related Risk Factors and Signs and Symptoms  Outcome: Ongoing (interventions implemented as appropriate)    09/18/17 0300   Fall Risk   Fall Risk: Related Risk Factors environment unfamiliar   Fall Risk: Signs and Symptoms presence of risk factors       Goal: Absence of Falls  Outcome: Ongoing (interventions implemented as appropriate)    09/18/17 0300   Fall Risk (Adult)   Absence of Falls making progress toward outcome

## 2017-09-19 ENCOUNTER — APPOINTMENT (OUTPATIENT)
Dept: GENERAL RADIOLOGY | Facility: HOSPITAL | Age: 67
End: 2017-09-19

## 2017-09-19 VITALS
DIASTOLIC BLOOD PRESSURE: 46 MMHG | OXYGEN SATURATION: 93 % | RESPIRATION RATE: 16 BRPM | BODY MASS INDEX: 39.18 KG/M2 | HEART RATE: 76 BPM | WEIGHT: 289.3 LBS | SYSTOLIC BLOOD PRESSURE: 106 MMHG | HEIGHT: 72 IN | TEMPERATURE: 98.2 F

## 2017-09-19 LAB
GLUCOSE BLDC GLUCOMTR-MCNC: 155 MG/DL (ref 70–130)
GLUCOSE BLDC GLUCOMTR-MCNC: 177 MG/DL (ref 70–130)

## 2017-09-19 PROCEDURE — G8978 MOBILITY CURRENT STATUS: HCPCS | Performed by: PHYSICAL THERAPIST

## 2017-09-19 PROCEDURE — G8980 MOBILITY D/C STATUS: HCPCS | Performed by: PHYSICAL THERAPIST

## 2017-09-19 PROCEDURE — 82962 GLUCOSE BLOOD TEST: CPT

## 2017-09-19 PROCEDURE — 25010000002 HEPARIN (PORCINE) PER 1000 UNITS: Performed by: NEUROLOGICAL SURGERY

## 2017-09-19 PROCEDURE — 97162 PT EVAL MOD COMPLEX 30 MIN: CPT

## 2017-09-19 PROCEDURE — G8979 MOBILITY GOAL STATUS: HCPCS | Performed by: PHYSICAL THERAPIST

## 2017-09-19 PROCEDURE — 63710000001 INSULIN LISPRO (HUMAN) PER 5 UNITS: Performed by: SPECIALIST

## 2017-09-19 PROCEDURE — 71020 HC CHEST PA AND LATERAL: CPT

## 2017-09-19 RX ORDER — OXYCODONE HYDROCHLORIDE AND ACETAMINOPHEN 5; 325 MG/1; MG/1
1 TABLET ORAL EVERY 4 HOURS PRN
Qty: 40 TABLET | Refills: 0 | Status: SHIPPED | OUTPATIENT
Start: 2017-09-19

## 2017-09-19 RX ORDER — METHYLCELLULOSE 2 G/19G
2 POWDER, FOR SOLUTION ORAL DAILY
Qty: 60 G | Refills: 6 | Status: SHIPPED | OUTPATIENT
Start: 2017-09-19 | End: 2017-10-19

## 2017-09-19 RX ORDER — MAGNESIUM CARB/ALUMINUM HYDROX 105-160MG
296 TABLET,CHEWABLE ORAL ONCE
Status: COMPLETED | OUTPATIENT
Start: 2017-09-19 | End: 2017-09-19

## 2017-09-19 RX ORDER — ONDANSETRON HCL 8 MG
8 TABLET ORAL EVERY 8 HOURS PRN
Qty: 10 TABLET | Refills: 1 | Status: SHIPPED | OUTPATIENT
Start: 2017-09-19

## 2017-09-19 RX ADMIN — HYDROCODONE BITARTRATE AND ACETAMINOPHEN 1 TABLET: 7.5; 325 TABLET ORAL at 00:52

## 2017-09-19 RX ADMIN — PAROXETINE 10 MG: 10 TABLET, FILM COATED ORAL at 08:26

## 2017-09-19 RX ADMIN — GABAPENTIN 300 MG: 300 CAPSULE ORAL at 08:30

## 2017-09-19 RX ADMIN — Medication 296 ML: at 14:28

## 2017-09-19 RX ADMIN — HEPARIN SODIUM 5000 UNITS: 5000 INJECTION, SOLUTION INTRAVENOUS; SUBCUTANEOUS at 08:27

## 2017-09-19 RX ADMIN — PANTOPRAZOLE SODIUM 40 MG: 40 TABLET, DELAYED RELEASE ORAL at 08:27

## 2017-09-19 RX ADMIN — INSULIN LISPRO 2 UNITS: 100 INJECTION, SOLUTION INTRAVENOUS; SUBCUTANEOUS at 08:27

## 2017-09-19 RX ADMIN — SUCRALFATE 1 G: 1 SUSPENSION ORAL at 00:00

## 2017-09-19 RX ADMIN — SUCRALFATE 1 G: 1 SUSPENSION ORAL at 11:33

## 2017-09-19 RX ADMIN — GLIPIZIDE 10 MG: 10 TABLET ORAL at 08:27

## 2017-09-19 RX ADMIN — HYDROCODONE BITARTRATE AND ACETAMINOPHEN 1 TABLET: 7.5; 325 TABLET ORAL at 07:49

## 2017-09-19 RX ADMIN — INSULIN LISPRO 2 UNITS: 100 INJECTION, SOLUTION INTRAVENOUS; SUBCUTANEOUS at 11:32

## 2017-09-19 RX ADMIN — OXYCODONE HYDROCHLORIDE AND ACETAMINOPHEN 1 TABLET: 5; 325 TABLET ORAL at 11:32

## 2017-09-19 RX ADMIN — METFORMIN HYDROCHLORIDE 1000 MG: 500 TABLET ORAL at 08:26

## 2017-09-19 RX ADMIN — OXYCODONE HYDROCHLORIDE AND ACETAMINOPHEN 1 TABLET: 5; 325 TABLET ORAL at 16:00
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
## 2017-09-19 NOTE — PLAN OF CARE
Problem: Patient Care Overview (Adult)  Goal: Plan of Care Review    09/19/17 1451   Coping/Psychosocial Response Interventions   Plan Of Care Reviewed With patient   Patient Care Overview   Progress progress toward functional goals as expected   Outcome Evaluation   Outcome Summary/Follow up Plan Pt agreed to and completed physical therapy evaluation. Pt was able to perform all bed mobility and transfers independently. Pt required supervision with ambulation and was able to go 50 feet. Pt is limited primarily by pain secondary to rib fx. Pt is safe with mobility to return home. Anticipated discharge to home with assistance.
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
## 2017-09-19 NOTE — PLAN OF CARE
Problem: Patient Care Overview (Adult)  Goal: Plan of Care Review  Outcome: Ongoing (interventions implemented as appropriate)    09/19/17 0338   Coping/Psychosocial Response Interventions   Plan Of Care Reviewed With patient   Patient Care Overview   Progress no change   Outcome Evaluation   Outcome Summary/Follow up Plan PCA DC'd norco prn for pain; no complaints of nausea; up to the bedside commode       Goal: Adult Individualization and Mutuality  Outcome: Ongoing (interventions implemented as appropriate)  Goal: Discharge Needs Assessment  Outcome: Ongoing (interventions implemented as appropriate)    Problem: Orthopaedic Fracture (Adult)  Goal: Signs and Symptoms of Listed Potential Problems Will be Absent or Manageable (Orthopaedic Fracture)  Outcome: Ongoing (interventions implemented as appropriate)    Problem: Pain, Acute (Adult)  Goal: Identify Related Risk Factors and Signs and Symptoms  Outcome: Outcome(s) achieved Date Met:  09/19/17  Goal: Acceptable Pain Control/Comfort Level  Outcome: Ongoing (interventions implemented as appropriate)    Problem: Fall Risk (Adult)  Goal: Identify Related Risk Factors and Signs and Symptoms  Outcome: Outcome(s) achieved Date Met:  09/19/17  Goal: Absence of Falls  Outcome: Ongoing (interventions implemented as appropriate)

## 2019-09-30 ENCOUNTER — OUTSIDE FACILITY SERVICE (OUTPATIENT)
Dept: CARDIOLOGY | Facility: CLINIC | Age: 69
End: 2019-09-30

## 2019-09-30 PROCEDURE — 78452 HT MUSCLE IMAGE SPECT MULT: CPT | Performed by: INTERNAL MEDICINE

## 2019-09-30 PROCEDURE — 93018 CV STRESS TEST I&R ONLY: CPT | Performed by: INTERNAL MEDICINE

## 2019-10-24 ENCOUNTER — OFFICE VISIT (OUTPATIENT)
Dept: CARDIOLOGY | Age: 69
End: 2019-10-24
Payer: MEDICARE

## 2019-10-24 VITALS
WEIGHT: 261 LBS | HEART RATE: 91 BPM | BODY MASS INDEX: 37.37 KG/M2 | SYSTOLIC BLOOD PRESSURE: 138 MMHG | DIASTOLIC BLOOD PRESSURE: 88 MMHG | HEIGHT: 70 IN

## 2019-10-24 DIAGNOSIS — I10 ESSENTIAL HYPERTENSION: ICD-10-CM

## 2019-10-24 DIAGNOSIS — R94.39 ABNORMAL STRESS TEST: Primary | ICD-10-CM

## 2019-10-24 DIAGNOSIS — I20.8 ANGINA AT REST (HCC): ICD-10-CM

## 2019-10-24 PROBLEM — I20.89 ANGINA AT REST: Status: ACTIVE | Noted: 2019-10-24

## 2019-10-24 PROCEDURE — G8417 CALC BMI ABV UP PARAM F/U: HCPCS | Performed by: INTERNAL MEDICINE

## 2019-10-24 PROCEDURE — 1036F TOBACCO NON-USER: CPT | Performed by: INTERNAL MEDICINE

## 2019-10-24 PROCEDURE — 1123F ACP DISCUSS/DSCN MKR DOCD: CPT | Performed by: INTERNAL MEDICINE

## 2019-10-24 PROCEDURE — 1090F PRES/ABSN URINE INCON ASSESS: CPT | Performed by: INTERNAL MEDICINE

## 2019-10-24 PROCEDURE — 3017F COLORECTAL CA SCREEN DOC REV: CPT | Performed by: INTERNAL MEDICINE

## 2019-10-24 PROCEDURE — 4040F PNEUMOC VAC/ADMIN/RCVD: CPT | Performed by: INTERNAL MEDICINE

## 2019-10-24 PROCEDURE — G8400 PT W/DXA NO RESULTS DOC: HCPCS | Performed by: INTERNAL MEDICINE

## 2019-10-24 PROCEDURE — 93000 ELECTROCARDIOGRAM COMPLETE: CPT | Performed by: INTERNAL MEDICINE

## 2019-10-24 PROCEDURE — G8484 FLU IMMUNIZE NO ADMIN: HCPCS | Performed by: INTERNAL MEDICINE

## 2019-10-24 PROCEDURE — 99204 OFFICE O/P NEW MOD 45 MIN: CPT | Performed by: INTERNAL MEDICINE

## 2019-10-24 PROCEDURE — G8599 NO ASA/ANTIPLAT THER USE RNG: HCPCS | Performed by: INTERNAL MEDICINE

## 2019-10-24 PROCEDURE — G8427 DOCREV CUR MEDS BY ELIG CLIN: HCPCS | Performed by: INTERNAL MEDICINE

## 2019-10-24 RX ORDER — PRAVASTATIN SODIUM 40 MG
40 TABLET ORAL DAILY
COMMUNITY

## 2019-10-24 RX ORDER — ALENDRONATE SODIUM 70 MG/1
70 TABLET ORAL
COMMUNITY

## 2019-10-24 RX ORDER — GABAPENTIN 300 MG/1
300 CAPSULE ORAL 3 TIMES DAILY
COMMUNITY

## 2019-10-24 RX ORDER — GLIPIZIDE 10 MG/1
10 TABLET ORAL
COMMUNITY

## 2019-10-24 RX ORDER — LOSARTAN POTASSIUM 100 MG/1
100 TABLET ORAL DAILY
COMMUNITY

## 2019-10-24 RX ORDER — PAROXETINE HYDROCHLORIDE 20 MG/1
20 TABLET, FILM COATED ORAL NIGHTLY
COMMUNITY

## 2019-10-24 SDOH — HEALTH STABILITY: MENTAL HEALTH: HOW OFTEN DO YOU HAVE A DRINK CONTAINING ALCOHOL?: NEVER

## 2019-10-24 ASSESSMENT — ENCOUNTER SYMPTOMS
VOMITING: 0
RESPIRATORY NEGATIVE: 1
NAUSEA: 0
SHORTNESS OF BREATH: 0
DIARRHEA: 0
EYES NEGATIVE: 1
GASTROINTESTINAL NEGATIVE: 1

## 2019-11-11 ENCOUNTER — TELEPHONE (OUTPATIENT)
Dept: CARDIOLOGY | Age: 69
End: 2019-11-11

## 2019-11-27 ENCOUNTER — TELEPHONE (OUTPATIENT)
Dept: CARDIOLOGY | Age: 69
End: 2019-11-27

## 2020-02-12 ENCOUNTER — TELEPHONE (OUTPATIENT)
Dept: CARDIOLOGY | Age: 70
End: 2020-02-12

## 2020-02-12 NOTE — TELEPHONE ENCOUNTER
Sury Daily requests that [place] return their call. The best time to reach her is Anytime. Pt called needing to talk to [facility] about resched ECHO and discuss heart cath procedure. Please contact pt. Thank you.

## 2020-02-20 ENCOUNTER — OFFICE VISIT (OUTPATIENT)
Dept: CARDIOLOGY | Age: 70
End: 2020-02-20
Payer: COMMERCIAL

## 2020-02-20 VITALS
WEIGHT: 253 LBS | SYSTOLIC BLOOD PRESSURE: 116 MMHG | DIASTOLIC BLOOD PRESSURE: 66 MMHG | BODY MASS INDEX: 36.22 KG/M2 | HEIGHT: 70 IN | HEART RATE: 64 BPM

## 2020-02-20 DIAGNOSIS — R94.39 ABNORMAL STRESS TEST: ICD-10-CM

## 2020-02-20 DIAGNOSIS — I10 ESSENTIAL HYPERTENSION: ICD-10-CM

## 2020-02-20 LAB
ANION GAP SERPL CALCULATED.3IONS-SCNC: 13 MMOL/L (ref 7–19)
BUN BLDV-MCNC: 32 MG/DL (ref 8–23)
CALCIUM SERPL-MCNC: 9.7 MG/DL (ref 8.8–10.2)
CHLORIDE BLD-SCNC: 104 MMOL/L (ref 98–111)
CO2: 27 MMOL/L (ref 22–29)
CREAT SERPL-MCNC: 1 MG/DL (ref 0.5–0.9)
GFR NON-AFRICAN AMERICAN: 55
GLUCOSE BLD-MCNC: 140 MG/DL (ref 74–109)
HCT VFR BLD CALC: 47.6 % (ref 37–47)
HEMOGLOBIN: 14.6 G/DL (ref 12–16)
MCH RBC QN AUTO: 29.1 PG (ref 27–31)
MCHC RBC AUTO-ENTMCNC: 30.7 G/DL (ref 33–37)
MCV RBC AUTO: 95 FL (ref 81–99)
PDW BLD-RTO: 13.7 % (ref 11.5–14.5)
PLATELET # BLD: 255 K/UL (ref 130–400)
PMV BLD AUTO: 11.6 FL (ref 9.4–12.3)
POTASSIUM SERPL-SCNC: 5.5 MMOL/L (ref 3.5–5)
RBC # BLD: 5.01 M/UL (ref 4.2–5.4)
SODIUM BLD-SCNC: 144 MMOL/L (ref 136–145)
WBC # BLD: 10.6 K/UL (ref 4.8–10.8)

## 2020-02-20 PROCEDURE — 99214 OFFICE O/P EST MOD 30 MIN: CPT | Performed by: INTERNAL MEDICINE

## 2020-02-20 ASSESSMENT — ENCOUNTER SYMPTOMS
RESPIRATORY NEGATIVE: 1
EYES NEGATIVE: 1
DIARRHEA: 0
GASTROINTESTINAL NEGATIVE: 1
NAUSEA: 0
SHORTNESS OF BREATH: 0
VOMITING: 0

## 2020-02-20 NOTE — PATIENT INSTRUCTIONS
[place] at the [address] [facility] and [address] [facility] located on the first floor of [place] [address] through hospital main entrance and turn immediately to your left. Date/Time: 2/26/20-- arrive at 6AM    Pre-operative work-up:  CBC, BMP    Allergies:  Patient has no known allergies. Contact number:  [phone] (home)     Cardiac Catheterization Instructions   · Do not eat or drink anything after midnight on the night before your procedure. You can take your morning medications with a sip of water unless otherwise directed not to. · Bring a list of the names and dosages of all the medications you are taking. · Diabetic medication should be stopped two days prior: Metformin (glucophage), Invokana (canagliflozin), Farxiga (dapagliflozin), Jardiance (empagliflozin)   · Coumadin (warfarin) should be stopped two days prior to this procedure. · Pradaxa (dabigatran) should be stopped one day prior to procedure. · You should arrange to have someone take you home rather than drive yourself. · Further plan will depend upon the result of the cardiac catheterization.       If for any reason you are unable to keep this appointment, please contact [facility], [phone], as soon as possible to reschedule.  -------------------------------------------------------------------------------------------------------------------

## 2020-02-20 NOTE — PROGRESS NOTES
[facility]                            Date:  2/20/2020  Patient: Saadia Ulloa  Age:  79 y. o., 1950      Reason for evaluation:         SUBJECTIVE:    Returns today follow-up assessment. Still has occasional mild chest pains not related activities does not describe typical angina. We did schedule her for diagnostic catheterization as her stress test in October showed inferior ischemia but she had to cancel due to inclement weather and has not rescheduled but wishes to. She has not had to take any nitroglycerin since her last visit. Blood pressure stable 116/66 heart rate stable 64 no other complaints noted. Review of Systems   Constitutional: Negative. Negative for chills, fever and unexpected weight change. HENT: Negative. Eyes: Negative. Respiratory: Negative. Negative for shortness of breath. Cardiovascular: Negative. Negative for chest pain. Gastrointestinal: Negative. Negative for diarrhea, nausea and vomiting. Endocrine: Negative. Genitourinary: Negative. Musculoskeletal: Negative. Skin: Negative. Neurological: Negative. All other systems reviewed and are negative. OBJECTIVE:     /66   Pulse 64   Ht 5' 10\" (1.778 m)   Wt 253 lb (114.8 kg)   BMI 36.30 kg/m²     Labs:   CBC: No results for input(s): WBC, HGB, HCT, PLT in the last 72 hours. BMP:No results for input(s): NA, K, CO2, BUN, CREATININE, LABGLOM, GLUCOSE in the last 72 hours. BNP: No results for input(s): BNP in the last 72 hours. PT/INR: No results for input(s): PROTIME, INR in the last 72 hours. APTT:No results for input(s): APTT in the last 72 hours. CARDIAC ENZYMES:No results for input(s): CKTOTAL, CKMB, CKMBINDEX, TROPONINI in the last 72 hours. FASTING LIPID PANEL:No results found for: HDL, LDLDIRECT, LDLCALC, TRIG  LIVER PROFILE:No results for input(s): AST, ALT, LABALBU in the last 72 hours.         Past Medical History:   Diagnosis Date    [facility]     HTN (hypertension)     Hyperlipidemia     Neuropathy      Past Surgical History:   Procedure Laterality Date    CATARACT REMOVAL Bilateral     HYSTERECTOMY      TOE AMPUTATION      TONSILLECTOMY      TOTAL KNEE ARTHROPLASTY       No family history on file. No Known Allergies  Current Outpatient Medications   Medication Sig Dispense Refill    aspirin 81 MG tablet Take 81 mg by mouth daily      gabapentin (NEURONTIN) 300 MG capsule Take 300 mg by mouth 3 times daily.  pravastatin (PRAVACHOL) 40 MG tablet Take 40 mg by mouth daily      glipiZIDE (GLUCOTROL) 10 MG tablet Take 10 mg by mouth 2 times daily (before meals)      PARoxetine (PAXIL) 20 MG tablet Take 20 mg by mouth every morning      losartan (COZAAR) 100 MG tablet Take 100 mg by mouth daily      metFORMIN (GLUCOPHAGE) 500 MG tablet Take 1,000 mg by mouth 2 times daily (with meals)       alendronate (FOSAMAX) 70 MG tablet Take 70 mg by mouth Twice a Week       dapagliflozin (FARXIGA) 10 MG tablet Take 10 mg by mouth every morning      exenatide (BYDUREON) 2 MG SRER injection Inject 2 mg into the skin once a week      Calcium Carbonate-Vitamin D (CALTRATE 600+D PO) Take 1 tablet by mouth daily       metoprolol tartrate (LOPRESSOR) 25 MG tablet Take 1 tablet by mouth 2 times daily 60 tablet 5     No current facility-administered medications for this visit.       Social History     Socioeconomic History    Marital status: Unknown     Spouse name: Not on file    Number of children: Not on file    Years of education: Not on file    Highest education level: Not on file   Occupational History    Not on file   Social Needs    Financial resource strain: Not on file    Food insecurity:     Worry: Not on file     Inability: Not on file    Transportation needs:     Medical: Not on file     Non-medical: Not on file   Tobacco Use    Smoking status: Former Smoker    Smokeless tobacco: Never Used   Substance and Sexual Activity    Alcohol use: Never     Frequency: Never    Drug use: Never    Sexual activity: Not on file   Lifestyle    Physical activity:     Days per week: Not on file     Minutes per session: Not on file    Stress: Not on file   Relationships    Social connections:     Talks on phone: Not on file     Gets together: Not on file     Attends Hinduism service: Not on file     Active member of club or organization: Not on file     Attends meetings of clubs or organizations: Not on file     Relationship status: Not on file    Intimate partner violence:     Fear of current or ex partner: Not on file     Emotionally abused: Not on file     Physically abused: Not on file     Forced sexual activity: Not on file   Other Topics Concern    Not on file   Social History Narrative    Not on file       Physical Examination:  /66   Pulse 64   Ht 5' 10\" (1.778 m)   Wt 253 lb (114.8 kg)   BMI 36.30 kg/m²   Physical Exam  Vitals signs reviewed. Constitutional:       Appearance: She is well-developed. Neck:      Vascular: No carotid bruit or JVD. Cardiovascular:      Rate and Rhythm: Normal rate and regular rhythm. Heart sounds: Normal heart sounds. No murmur. No friction rub. No gallop. Pulmonary:      Effort: Pulmonary effort is normal. No respiratory distress. Breath sounds: Normal breath sounds. No wheezing or rales. Abdominal:      General: There is no distension. Tenderness: There is no abdominal tenderness. Lymphadenopathy:      Cervical: No cervical adenopathy. Skin:     General: Skin is warm and dry. ASSESSMENT:     Diagnosis Orders   1. Essential hypertension     2. Abnormal stress test         PLAN:  No orders of the defined types were placed in this encounter. No orders of the defined types were placed in this encounter. 1. Continue present medications  2.  Recommend diagnostic catheterization will reschedule advised indications alternatives benefits and risk she is

## 2020-02-26 ENCOUNTER — APPOINTMENT (OUTPATIENT)
Dept: GENERAL RADIOLOGY | Age: 70
End: 2020-02-26
Attending: INTERNAL MEDICINE
Payer: MEDICARE

## 2020-02-26 ENCOUNTER — HOSPITAL ENCOUNTER (OUTPATIENT)
Dept: CARDIAC CATH/INVASIVE PROCEDURES | Age: 70
Discharge: HOME | End: 2020-02-26
Attending: INTERNAL MEDICINE | Admitting: INTERNAL MEDICINE
Payer: MEDICARE

## 2020-02-26 VITALS
RESPIRATION RATE: 14 BRPM | SYSTOLIC BLOOD PRESSURE: 135 MMHG | TEMPERATURE: 97.5 F | OXYGEN SATURATION: 85 % | WEIGHT: 253 LBS | HEART RATE: 66 BPM | HEIGHT: 70 IN | DIASTOLIC BLOOD PRESSURE: 63 MMHG | BODY MASS INDEX: 36.22 KG/M2

## 2020-02-26 LAB
ALBUMIN SERPL-MCNC: 3.7 G/DL (ref 3.5–5.2)
ALP BLD-CCNC: 114 U/L (ref 35–104)
ALT SERPL-CCNC: 15 U/L (ref 5–33)
ANION GAP SERPL CALCULATED.3IONS-SCNC: 11 MMOL/L (ref 7–19)
AST SERPL-CCNC: 20 U/L (ref 5–32)
BILIRUB SERPL-MCNC: 0.5 MG/DL (ref 0.2–1.2)
BUN BLDV-MCNC: 17 MG/DL (ref 8–23)
CALCIUM SERPL-MCNC: 9.3 MG/DL (ref 8.8–10.2)
CHLORIDE BLD-SCNC: 102 MMOL/L (ref 98–111)
CHOLESTEROL, TOTAL: 101 MG/DL (ref 160–199)
CO2: 26 MMOL/L (ref 22–29)
CREAT SERPL-MCNC: 0.7 MG/DL (ref 0.5–0.9)
EKG P AXIS: -47 DEGREES
EKG P-R INTERVAL: 236 MS
EKG Q-T INTERVAL: 392 MS
EKG QRS DURATION: 96 MS
EKG QTC CALCULATION (BAZETT): 387 MS
EKG T AXIS: 60 DEGREES
GFR NON-AFRICAN AMERICAN: >60
GLUCOSE BLD-MCNC: 198 MG/DL (ref 74–109)
HDLC SERPL-MCNC: 42 MG/DL (ref 65–121)
LDL CHOLESTEROL CALCULATED: 34 MG/DL
POTASSIUM REFLEX MAGNESIUM: 4.4 MMOL/L (ref 3.5–5)
SODIUM BLD-SCNC: 139 MMOL/L (ref 136–145)
TOTAL PROTEIN: 6.8 G/DL (ref 6.6–8.7)
TRIGL SERPL-MCNC: 125 MG/DL (ref 0–149)

## 2020-02-26 PROCEDURE — 71046 X-RAY EXAM CHEST 2 VIEWS: CPT

## 2020-02-26 PROCEDURE — C1894 INTRO/SHEATH, NON-LASER: HCPCS

## 2020-02-26 PROCEDURE — C1769 GUIDE WIRE: HCPCS

## 2020-02-26 PROCEDURE — 6360000002 HC RX W HCPCS

## 2020-02-26 PROCEDURE — 2580000003 HC RX 258: Performed by: INTERNAL MEDICINE

## 2020-02-26 PROCEDURE — 80061 LIPID PANEL: CPT

## 2020-02-26 PROCEDURE — 80053 COMPREHEN METABOLIC PANEL: CPT

## 2020-02-26 PROCEDURE — 93458 L HRT ARTERY/VENTRICLE ANGIO: CPT

## 2020-02-26 PROCEDURE — 93005 ELECTROCARDIOGRAM TRACING: CPT | Performed by: INTERNAL MEDICINE

## 2020-02-26 PROCEDURE — 2709999900 HC NON-CHARGEABLE SUPPLY

## 2020-02-26 PROCEDURE — 36415 COLL VENOUS BLD VENIPUNCTURE: CPT

## 2020-02-26 PROCEDURE — 99152 MOD SED SAME PHYS/QHP 5/>YRS: CPT

## 2020-02-26 PROCEDURE — 99152 MOD SED SAME PHYS/QHP 5/>YRS: CPT | Performed by: INTERNAL MEDICINE

## 2020-02-26 PROCEDURE — 2500000003 HC RX 250 WO HCPCS

## 2020-02-26 PROCEDURE — 6360000004 HC RX CONTRAST MEDICATION: Performed by: INTERNAL MEDICINE

## 2020-02-26 PROCEDURE — 93458 L HRT ARTERY/VENTRICLE ANGIO: CPT | Performed by: INTERNAL MEDICINE

## 2020-02-26 PROCEDURE — 93010 ELECTROCARDIOGRAM REPORT: CPT | Performed by: INTERNAL MEDICINE

## 2020-02-26 RX ORDER — HYDRALAZINE HYDROCHLORIDE 20 MG/ML
10 INJECTION INTRAMUSCULAR; INTRAVENOUS EVERY 10 MIN PRN
Status: DISCONTINUED | OUTPATIENT
Start: 2020-02-26 | End: 2020-02-26 | Stop reason: HOSPADM

## 2020-02-26 RX ORDER — ACETAMINOPHEN 325 MG/1
650 TABLET ORAL EVERY 4 HOURS PRN
Status: DISCONTINUED | OUTPATIENT
Start: 2020-02-26 | End: 2020-02-26 | Stop reason: HOSPADM

## 2020-02-26 RX ORDER — ONDANSETRON HYDROCHLORIDE 8 MG/1
8 TABLET, FILM COATED ORAL EVERY 8 HOURS PRN
COMMUNITY
Start: 2017-09-19

## 2020-02-26 RX ORDER — ALPRAZOLAM 0.5 MG/1
0.5 TABLET ORAL
Status: DISCONTINUED | OUTPATIENT
Start: 2020-02-26 | End: 2020-02-26 | Stop reason: HOSPADM

## 2020-02-26 RX ORDER — OXYCODONE HYDROCHLORIDE AND ACETAMINOPHEN 5; 325 MG/1; MG/1
1 TABLET ORAL EVERY 8 HOURS PRN
COMMUNITY
Start: 2017-09-19

## 2020-02-26 RX ORDER — SODIUM CHLORIDE 9 MG/ML
INJECTION, SOLUTION INTRAVENOUS CONTINUOUS
Status: DISCONTINUED | OUTPATIENT
Start: 2020-02-26 | End: 2020-02-26 | Stop reason: SDUPTHER

## 2020-02-26 RX ORDER — SODIUM CHLORIDE 0.9 % (FLUSH) 0.9 %
10 SYRINGE (ML) INJECTION PRN
Status: DISCONTINUED | OUTPATIENT
Start: 2020-02-26 | End: 2020-02-26 | Stop reason: HOSPADM

## 2020-02-26 RX ORDER — ONDANSETRON 2 MG/ML
4 INJECTION INTRAMUSCULAR; INTRAVENOUS EVERY 6 HOURS PRN
Status: DISCONTINUED | OUTPATIENT
Start: 2020-02-26 | End: 2020-02-26 | Stop reason: SDUPTHER

## 2020-02-26 RX ORDER — ASPIRIN 81 MG/1
81 TABLET ORAL ONCE
Status: DISCONTINUED | OUTPATIENT
Start: 2020-02-26 | End: 2020-02-26 | Stop reason: HOSPADM

## 2020-02-26 RX ORDER — SODIUM CHLORIDE 9 MG/ML
1000 INJECTION, SOLUTION INTRAVENOUS CONTINUOUS
Status: DISCONTINUED | OUTPATIENT
Start: 2020-02-26 | End: 2020-02-26 | Stop reason: HOSPADM

## 2020-02-26 RX ORDER — ONDANSETRON 2 MG/ML
4 INJECTION INTRAMUSCULAR; INTRAVENOUS EVERY 6 HOURS PRN
Status: DISCONTINUED | OUTPATIENT
Start: 2020-02-26 | End: 2020-02-26 | Stop reason: HOSPADM

## 2020-02-26 RX ORDER — SODIUM CHLORIDE 0.9 % (FLUSH) 0.9 %
10 SYRINGE (ML) INJECTION EVERY 12 HOURS SCHEDULED
Status: DISCONTINUED | OUTPATIENT
Start: 2020-02-26 | End: 2020-02-26 | Stop reason: HOSPADM

## 2020-02-26 RX ADMIN — SODIUM CHLORIDE: 9 INJECTION, SOLUTION INTRAVENOUS at 07:26

## 2020-02-26 RX ADMIN — IOPAMIDOL 126 ML: 612 INJECTION, SOLUTION INTRAVENOUS at 12:14

## 2020-02-26 NOTE — PROGRESS NOTES
Cardiac catheterization completed via right radial artery approach tolerated well. Left ventricular function normal.  Coronary arteriograms unremarkable.

## 2020-02-26 NOTE — H&P
results for input(s): CKTOTAL, CKMB, CKMBINDEX, TROPONINI in the last 72 hours. FASTING LIPID PANEL:No results found for: HDL, LDLDIRECT, LDLCALC, TRIG  LIVER PROFILE:No results for input(s): AST, ALT, LABALBU in the last 72 hours.         Past Medical History        Past Medical History:   Diagnosis Date    Diabetes ([address]      HTN (hypertension)      Hyperlipidemia      Neuropathy           Past Surgical History         Past Surgical History:   Procedure Laterality Date    CATARACT REMOVAL Bilateral      HYSTERECTOMY        TOE AMPUTATION        TONSILLECTOMY        TOTAL KNEE ARTHROPLASTY             Family History   No family history on file.      No Known Allergies  Current Facility-Administered Medications          Current Outpatient Medications   Medication Sig Dispense Refill    aspirin 81 MG tablet Take 81 mg by mouth daily        gabapentin (NEURONTIN) 300 MG capsule Take 300 mg by mouth 3 times daily.        pravastatin (PRAVACHOL) 40 MG tablet Take 40 mg by mouth daily        glipiZIDE (GLUCOTROL) 10 MG tablet Take 10 mg by mouth 2 times daily (before meals)        PARoxetine (PAXIL) 20 MG tablet Take 20 mg by mouth every morning        losartan (COZAAR) 100 MG tablet Take 100 mg by mouth daily        metFORMIN (GLUCOPHAGE) 500 MG tablet Take 1,000 mg by mouth 2 times daily (with meals)         alendronate (FOSAMAX) 70 MG tablet Take 70 mg by mouth Twice a Week         dapagliflozin (FARXIGA) 10 MG tablet Take 10 mg by mouth every morning        exenatide (BYDUREON) 2 MG SRER injection Inject 2 mg into the skin once a week        Calcium Carbonate-Vitamin D (CALTRATE 600+D PO) Take 1 tablet by mouth daily         metoprolol tartrate (LOPRESSOR) 25 MG tablet Take 1 tablet by mouth 2 times daily 60 tablet 5      No current facility-administered medications for this visit.          Social History               Socioeconomic History    Marital status: Unknown       Spouse name: Not on 4 weeks (around 3/19/2020) for return to Dr. Ana M Payton only.        Jigna Fitzpatrick MD 2/20/2020 9:42 AM     [id number] [facility]        Thisdictation was generated by voice recognition computer software. Although all attempts are made to edit the dictation for accuracy, there may be errors in the transcription that are not intended.                             Instructions         Return in about 4 weeks (around 3/19/2020) for return to Dr. Ana M Payton only. [place] [facility] [address] [facility] located on the first floor of [place] [address] through hospital main entrance and turn immediately to your left.     Date/Time: 2/26/20-- arrive at 6AM     Pre-operative work-up:  CBC, BMP     Allergies:  Patient has no known allergies. Contact number:  [phone] (home)      Cardiac Catheterization Instructions   · Do not eat or drink anything after midnight on the night before your procedure. You can take your morning medications with a sip of water unless otherwise directed not to. · Bring a list of the names and dosages of all the medications you are taking. · Diabetic medication should be stopped two days prior: Metformin (glucophage), Invokana (canagliflozin), Farxiga (dapagliflozin), Jardiance (empagliflozin)   · Coumadin (warfarin) should be stopped two days prior to this procedure. · Pradaxa (dabigatran) should be stopped one day prior to procedure. · You should arrange to have someone take you home rather than drive yourself.   · Further plan will depend upon the result of the cardiac catheterization.       If for any reason you are unable to keep this appointment, please contact [facility], [phone], as soon as possible to reschedule.  -------------------------------------------------------------------------------------------------------------------  No significant interval history change since above visit              After Visit Summary